# Patient Record
Sex: FEMALE | Race: BLACK OR AFRICAN AMERICAN | NOT HISPANIC OR LATINO | URBAN - METROPOLITAN AREA
[De-identification: names, ages, dates, MRNs, and addresses within clinical notes are randomized per-mention and may not be internally consistent; named-entity substitution may affect disease eponyms.]

---

## 2017-08-23 ENCOUNTER — HOSPITAL ENCOUNTER (EMERGENCY)
Facility: CLINIC | Age: 62
Discharge: HOME | End: 2017-08-23
Attending: EMERGENCY MEDICINE | Admitting: INTERNAL MEDICINE
Payer: COMMERCIAL

## 2017-08-23 ENCOUNTER — TRANSFERRED RECORDS (OUTPATIENT)
Dept: HEALTH INFORMATION MANAGEMENT | Facility: CLINIC | Age: 62
End: 2017-08-23

## 2017-08-23 VITALS
DIASTOLIC BLOOD PRESSURE: 90 MMHG | HEIGHT: 64 IN | RESPIRATION RATE: 14 BRPM | HEART RATE: 85 BPM | OXYGEN SATURATION: 96 % | TEMPERATURE: 97.8 F | SYSTOLIC BLOOD PRESSURE: 154 MMHG | WEIGHT: 147 LBS | BODY MASS INDEX: 25.1 KG/M2

## 2017-08-23 DIAGNOSIS — T18.108A FOREIGN BODY IN ESOPHAGUS, INITIAL ENCOUNTER: ICD-10-CM

## 2017-08-23 DIAGNOSIS — K22.2 STRICTURE OF ESOPHAGUS: ICD-10-CM

## 2017-08-23 PROCEDURE — 96374 THER/PROPH/DIAG INJ IV PUSH: CPT

## 2017-08-23 PROCEDURE — 25000128 H RX IP 250 OP 636: Performed by: INTERNAL MEDICINE

## 2017-08-23 PROCEDURE — 99292 CRITICAL CARE ADDL 30 MIN: CPT

## 2017-08-23 PROCEDURE — 99291 CRITICAL CARE FIRST HOUR: CPT

## 2017-08-23 PROCEDURE — 40000275 ZZH STATISTIC RCP TIME EA 10 MIN

## 2017-08-23 PROCEDURE — 43247 EGD REMOVE FOREIGN BODY: CPT | Performed by: INTERNAL MEDICINE

## 2017-08-23 PROCEDURE — 96375 TX/PRO/DX INJ NEW DRUG ADDON: CPT

## 2017-08-23 RX ORDER — FENTANYL CITRATE 50 UG/ML
50 INJECTION, SOLUTION INTRAMUSCULAR; INTRAVENOUS
Status: COMPLETED | OUTPATIENT
Start: 2017-08-23 | End: 2017-08-23

## 2017-08-23 RX ORDER — PANTOPRAZOLE SODIUM 40 MG/1
40 TABLET, DELAYED RELEASE ORAL DAILY
Qty: 30 TABLET | Refills: 0 | Status: SHIPPED | OUTPATIENT
Start: 2017-08-23 | End: 2017-09-22

## 2017-08-23 RX ORDER — NALOXONE HYDROCHLORIDE 0.4 MG/ML
.1-.4 INJECTION, SOLUTION INTRAMUSCULAR; INTRAVENOUS; SUBCUTANEOUS
Status: DISCONTINUED | OUTPATIENT
Start: 2017-08-23 | End: 2017-08-23 | Stop reason: HOSPADM

## 2017-08-23 RX ORDER — FLUMAZENIL 0.1 MG/ML
0.2 INJECTION, SOLUTION INTRAVENOUS
Status: DISCONTINUED | OUTPATIENT
Start: 2017-08-23 | End: 2017-08-23 | Stop reason: HOSPADM

## 2017-08-23 RX ORDER — SUCRALFATE ORAL 1 G/10ML
1 SUSPENSION ORAL 4 TIMES DAILY
Qty: 420 ML | Refills: 1 | Status: SHIPPED | OUTPATIENT
Start: 2017-08-23

## 2017-08-23 RX ORDER — FENTANYL CITRATE 50 UG/ML
25-50 INJECTION, SOLUTION INTRAMUSCULAR; INTRAVENOUS EVERY 5 MIN PRN
Status: DISCONTINUED | OUTPATIENT
Start: 2017-08-23 | End: 2017-08-23 | Stop reason: HOSPADM

## 2017-08-23 RX ORDER — LIDOCAINE 40 MG/G
CREAM TOPICAL
Status: CANCELLED | OUTPATIENT
Start: 2017-08-23

## 2017-08-23 RX ADMIN — FENTANYL CITRATE 50 MCG: 50 INJECTION, SOLUTION INTRAMUSCULAR; INTRAVENOUS at 05:35

## 2017-08-23 RX ADMIN — MIDAZOLAM HYDROCHLORIDE 1 MG: 1 INJECTION, SOLUTION INTRAMUSCULAR; INTRAVENOUS at 05:53

## 2017-08-23 RX ADMIN — MIDAZOLAM HYDROCHLORIDE 2 MG: 1 INJECTION, SOLUTION INTRAMUSCULAR; INTRAVENOUS at 05:35

## 2017-08-23 RX ADMIN — FENTANYL CITRATE 50 MCG: 50 INJECTION, SOLUTION INTRAMUSCULAR; INTRAVENOUS at 05:53

## 2017-08-23 RX ADMIN — MIDAZOLAM HYDROCHLORIDE 2 MG: 1 INJECTION, SOLUTION INTRAMUSCULAR; INTRAVENOUS at 05:43

## 2017-08-23 ASSESSMENT — ENCOUNTER SYMPTOMS
ABDOMINAL PAIN: 0
CHEST TIGHTNESS: 0
SHORTNESS OF BREATH: 0
SORE THROAT: 1
TROUBLE SWALLOWING: 1
LIGHT-HEADEDNESS: 0

## 2017-08-23 NOTE — ED AVS SNAPSHOT
Emergency Department    [address] [place] [address]    Phone:  [phone]    Fax:  [phone]                                       Mehreen Coronado   MRN: [id number]    Department:   Emergency Department   Date of Visit:  8/23/2017           After Visit Summary Signature Page     I have received my discharge instructions, and my questions have been answered. I have discussed any challenges I see with this plan with the nurse or doctor.    ..........................................................................................................................................  Patient/Patient Representative Signature      ..........................................................................................................................................  Patient Representative Print Name and Relationship to Patient    ..................................................               ................................................  Date                                            Time    ..........................................................................................................................................  Reviewed by Signature/Title    ...................................................              ..............................................  Date                                                            Time

## 2017-08-23 NOTE — DISCHARGE INSTRUCTIONS
Esophageal Blockage, Resolved  The esophagus is the passage that carries food from the mouth to the stomach. You had a blockage in the esophagus. This can happen after swallowing a large piece of food, taking a large pill, or swallowing foreign objects.  If this is a recurring problem, it can be a sign of disease in the esophagus, such as inflammation (swelling and irritation) or scarring. If you did not have a special procedure (endoscopy) today to treat your condition, further testing will be needed to evaluate this problem.  The blockage has cleared. You should be able to swallow normally again.  Home care    For the next 24 hours you may drink liquids and eat soft foods.    You may have been given medicine today to prevent pain and help you relax. If so, you may feel drowsy for the next 4 to 12 hours. Do not drive or operate dangerous equipment until you feel alert again.    If your esophagus was blocked by food, be sure to cut solid food into small pieces before putting it into your mouth. Chew all foods well before swallowing.    If your esophagus was blocked by an over-the-counter pill (such as a vitamin), avoid this size pill in the future. If it was blocked by a prescription medicine, ask your health care provider for another form of medicine.  Follow-up care  Follow up with your health care provider, or as advised. If you continue to have problems, contact your doctor or this facility for advice. If this is a recurring problem, talk to your health care provider about it. He or she may suggest having an endoscopy. This is a look in the esophagus with a small camera and light in a narrow, flexible tube).  When to seek medical advice  Call your health care provider right away if any of these occur:    Chest pain or shortness of breath    Unable to swallow    Significant pain on swallowing    Vomiting blood (red or black)    Blood in your stool (dark red or black color)    Fever of 100.4 F (38 C) or higher,  or as directed by your health care provider  Date Last Reviewed: 3/22/2015    6488-8096 [employer]. [address] [place] [address], [place] [address]. All rights reserved. This information is not intended as a substitute for professional medical care. Always follow your healthcare professional's instructions.

## 2017-08-23 NOTE — ED PROVIDER NOTES
History     Chief Complaint:  Swallowed Foreign Body    HPI   Mehreen Coronado is a 62 year old female who presents with a swallowed foreign body. The patient is a non native English speaker and presents with her son for translation. The patient was sent from [facility] after eating goat meat during the evening when she felt pain and something stick in her throat. The son tried to remove the foreign body with his fingers but couldn't feel anything, though a small amount of blood did get on his fingers. She was able to swallow secretions prior. She received an x-ray at [place] which showed a goat bone stuck in her esophagus. GI was consulted and she was directed here for further treatment. She reports having trouble swallowing secretions but denies any vomiting, abdominal pain, trouble breathing, or any other symptoms.    Allergies:  No known drug allergies.     Medications:    OMEPRAZOLE PO     Past Medical History:    Acid Reflux    Past Surgical History:    Cholecystectomy    Family History:    History reviewed. No pertinent family history.    Social History:  Smoking status: Never smoker  Alcohol use: No  Marital Status:  Unknown     Review of Systems   HENT: Positive for sore throat and trouble swallowing.         + foreign body   Respiratory: Negative for chest tightness and shortness of breath.    Cardiovascular: Negative for chest pain.   Gastrointestinal: Negative for abdominal pain.   Neurological: Negative for light-headedness.   All other systems reviewed and are negative.      Physical Exam     Patient Vitals for the past 24 hrs:   BP Temp Temp src Pulse Heart Rate Resp SpO2 Height Weight   08/23/17 0630 119/69 - - 85 - - 100 % - -   08/23/17 0620 118/71 - - 88 - - 100 % - -   08/23/17 0615 125/75 - - 90 - - 100 % - -   08/23/17 0610 118/68 - - 96 - - 100 % - -   08/23/17 0605 141/77 - - 106 106 - 100 % - -   08/23/17 0600 122/73 - - 99 99 - 100 % - -   08/23/17 0555 156/89 - - - 104 -  "100 % - -   08/23/17 0550 150/83 - - - 114 - 100 % - -   08/23/17 0545 157/76 - - - 107 - 100 % - -   08/23/17 0540 138/80 - - - 102 - 100 % - -   08/23/17 0535 144/77 - - - 110 - 100 % - -   08/23/17 0530 (!) 149/98 - - - 111 - 100 % - -   08/23/17 0526 142/87 - - - 105 - 100 % - -   08/23/17 0500 147/84 - - - - - 98 % - -   08/23/17 0430 (!) 151/91 - - - - - 98 % - -   08/23/17 0359 153/90 97.8  F (36.6  C) Temporal 106 - 14 100 % 1.626 m (5' 4\") 66.7 kg (147 lb)       Physical Exam  Nursing note and vitals reviewed.  Constitutional:  Oriented to person, place, and time. Cooperative.   HENT:   Nose:    Nose normal.   Mouth/Throat:   Mucous membranes are normal.   Eyes:    Conjunctivae normal and EOM are normal.      Pupils are equal, round, and reactive to light.   Neck:    Trachea normal.   Cardiovascular:  Normal rate, regular rhythm, normal heart sounds and normal pulses. No murmur heard.  Pulmonary/Chest:  Effort normal and breath sounds normal.   Abdominal:   Soft. Normal appearance and bowel sounds are normal.      There is no tenderness.      There is no rebound and no CVA tenderness.   Musculoskeletal:  Extremities atraumatic x 4.   Lymphadenopathy:  No cervical adenopathy.   Neurological:   Alert and oriented to person, place, and time. Normal strength.      No cranial nerve deficit or sensory deficit. GCS eye subscore is 4. GCS verbal subscore is 5. GCS motor subscore is 6.   Skin:    Skin is intact. No rash noted.   Psychiatric:   Normal mood and affect.      Emergency Department Course   Interventions:  (0535) Fentanyl 50 mcg, IV Injection  (0535) Versed 2 mg, IV Injection  (0553) Fentanyl 50 mcg, IV Injection  (0553) Versed 2 mg, IV Injection    Emergency Department Course:  Nursing notes and vitals reviewed.  (5975) I performed an exam of the patient as documented above.    Patient had an upper endoscopy, under anaesthesia noted above, performed by the on-call GI physician Dr. Gandhi here in the ER to " remove the lodged foreign body from the patient's esophagus.    Findings and plan explained to the patient and son. Patient discharged home with instructions regarding supportive care, medications, and reasons to return. The importance of close follow-up was reviewed. The patient was prescribed Protonix and Sucralfate.  Impression & Plan      Medical Decision Making:  Mehreen Coronado is a 62 year old female who apparently swallowed a goat bone and it became lodged in her upper esophagus. She first presented to the [facility] stand alone ER. They sent her here for GI consultation and endoscopy. They spoke with the on-call GI physician, Dr. Gandhi. I called Dr. Gandhi when the patient arrived here as well, and he then came in and performed an upper endoscopy and was able to remove the bone and bone fragments. The patient feels comfortable going home and prefers to go home. I will send her home with prescriptions for Protonix and Carafate. She is to follow up with Dr. Gandhi as well so that he can dilate the stricture that he saw. She should return with any concerns or worsening symptoms.    Diagnosis:    ICD-10-CM     Encounter Diagnoses   Name Primary?     Foreign body in esophagus, initial encounter      Stricture of esophagus          Disposition:  Patient is discharged to home.      Discharge Medications:  New Prescriptions    PANTOPRAZOLE (PROTONIX) 40 MG EC TABLET    Take 1 tablet (40 mg) by mouth daily for 30 doses    SUCRALFATE (CARAFATE) 1 GM/10ML SUSPENSION    Take 10 mLs (1 g) by mouth 4 times daily         Ry Hope  8/23/2017    EMERGENCY DEPARTMENT      I, Ry Hope, am serving as a scribe on 8/23/2017 at 4:09 AM to personally document services performed by Dr. Smyth based on my observations and the provider's statements to me.        Olman Smyth MD  08/23/17 1605

## 2017-08-23 NOTE — ED AVS SNAPSHOT
Emergency Department    [address] [facility] [address]    Phone:  [phone]    Fax:  [phone]                                       Mehreen Coronado   MRN: [id number]    Department:   Emergency Department   Date of Visit:  8/23/2017           Patient Information     Date Of Birth          1955        Your diagnoses for this visit were:     Foreign body in esophagus, initial encounter     Stricture of esophagus        You were seen by Olman Smyth MD.      Follow-up Information     Schedule an appointment as soon as possible for a visit with Nilson Garcia MD.    Specialty:  Gastroenterology    Contact information:    [facility]  [facility] [address] [place] [address] [phone]          Follow up with  Emergency Department.    Specialty:  EMERGENCY MEDICINE    Why:  If symptoms worsen    Contact information:    [address] [facility] [id number] [phone]        Discharge Instructions         Esophageal Blockage, Resolved  The esophagus is the passage that carries food from the mouth to the stomach. You had a blockage in the esophagus. This can happen after swallowing a large piece of food, taking a large pill, or swallowing foreign objects.  If this is a recurring problem, it can be a sign of disease in the esophagus, such as inflammation (swelling and irritation) or scarring. If you did not have a special procedure (endoscopy) today to treat your condition, further testing will be needed to evaluate this problem.  The blockage has cleared. You should be able to swallow normally again.  Home care    For the next 24 hours you may drink liquids and eat soft foods.    You may have been given medicine today to prevent pain and help you relax. If so, you may feel drowsy for the next 4 to 12 hours. Do not drive or operate dangerous equipment until you feel alert again.    If your esophagus was blocked by food, be sure to cut solid food into  small pieces before putting it into your mouth. Chew all foods well before swallowing.    If your esophagus was blocked by an over-the-counter pill (such as a vitamin), avoid this size pill in the future. If it was blocked by a prescription medicine, ask your health care provider for another form of medicine.  Follow-up care  Follow up with your health care provider, or as advised. If you continue to have problems, contact your doctor or this facility for advice. If this is a recurring problem, talk to your health care provider about it. He or she may suggest having an endoscopy. This is a look in the esophagus with a small camera and light in a narrow, flexible tube).  When to seek medical advice  Call your health care provider right away if any of these occur:    Chest pain or shortness of breath    Unable to swallow    Significant pain on swallowing    Vomiting blood (red or black)    Blood in your stool (dark red or black color)    Fever of 100.4 F (38 C) or higher, or as directed by your health care provider  Date Last Reviewed: 3/22/2015    7331-0676 [employer]. [address] [place] [address]. All rights reserved. This information is not intended as a substitute for professional medical care. Always follow your healthcare professional's instructions.          24 Hour Appointment Hotline       To make an appointment at any [facility] clinic, call [phone] ([phone]). If you don't have a family doctor or clinic, we will help you find one. [facility] clinics are conveniently located to serve the needs of you and your family.             Review of your medicines      START taking        Dose / Directions Last dose taken    pantoprazole 40 MG EC tablet   Commonly known as:  PROTONIX   Dose:  40 mg   Quantity:  30 tablet        Take 1 tablet (40 mg) by mouth daily for 30 doses   Refills:  0        sucralfate 1 GM/10ML suspension   Commonly known as:  CARAFATE   Dose:  1 g    Quantity:  420 mL        Take 10 mLs (1 g) by mouth 4 times daily   Refills:  1          Our records show that you are taking the medicines listed below. If these are incorrect, please call your family doctor or clinic.        Dose / Directions Last dose taken    OMEPRAZOLE PO        Refills:  0                Prescriptions were sent or printed at these locations (2 Prescriptions)                   Other Prescriptions                Printed at Department/Unit printer (2 of 2)         pantoprazole (PROTONIX) 40 MG EC tablet               sucralfate (CARAFATE) 1 GM/10ML suspension                Orders Needing Specimen Collection     None      Pending Results     No orders found from 8/21/2017 to 8/24/2017.            Pending Culture Results     No orders found from 8/21/2017 to 8/24/2017.            Pending Results Instructions     If you had any lab results that were not finalized at the time of your Discharge, you can call the ED Lab Result RN at [phone]. You will be contacted by this team for any positive Lab results or changes in treatment. The nurses are available 7 days a week from 10A to 6:30P.  You can leave a message 24 hours per day and they will return your call.        Test Results From Your Hospital Stay               Clinical Quality Measure: Blood Pressure Screening     Your blood pressure was checked while you were in the emergency department today. The last reading we obtained was  BP: (!) 135/93 . Please read the guidelines below about what these numbers mean and what you should do about them.  If your systolic blood pressure (the top number) is less than 120 and your diastolic blood pressure (the bottom number) is less than 80, then your blood pressure is normal. There is nothing more that you need to do about it.  If your systolic blood pressure (the top number) is 120-139 or your diastolic blood pressure (the bottom number) is 80-89, your blood pressure may be higher than it should be. You  "should have your blood pressure rechecked within a year by a primary care provider.  If your systolic blood pressure (the top number) is 140 or greater or your diastolic blood pressure (the bottom number) is 90 or greater, you may have high blood pressure. High blood pressure is treatable, but if left untreated over time it can put you at risk for heart attack, stroke, or kidney failure. You should have your blood pressure rechecked by a primary care provider within the next 4 weeks.  If your provider in the emergency department today gave you specific instructions to follow-up with your doctor or provider even sooner than that, you should follow that instruction and not wait for up to 4 weeks for your follow-up visit.        Thank you for choosing [facility]       Thank you for choosing [facility] for your care. Our goal is always to provide you with excellent care. Hearing back from our patients is one way we can continue to improve our services. Please take a few minutes to complete the written survey that you may receive in the mail after you visit with us. Thank you!        [employer] Information     [employer] lets you send messages to your doctor, view your test results, renew your prescriptions, schedule appointments and more. To sign up, go to [email or web address] . Click on \"Log in\" on the left side of the screen, which will take you to the Welcome page. Then click on \"Sign up Now\" on the right side of the page.     You will be asked to enter the access code listed below, as well as some personal information. Please follow the directions to create your username and password.     Your access code is: [email or web address]  Expires: 2017  7:49 AM     Your access code will  in 90 days. If you need help or a new code, please call your [facility] clinic or [phone].        Care EveryWhere ID     This is your Care EveryWhere ID. This could be used by other organizations to access your [facility] medical " records  [id number]        Equal Access to Services     HERNANDO LOBO : Tato Cee, meme mckeon, kee almaguer, vee ham. So [facility] [phone].    ATENCIÓN: Si habla español, tiene a cortez disposición servicios gratuitos de asistencia lingüística. Llame al [phone].    We comply with applicable federal civil rights laws and [place] laws. We do not discriminate on the basis of race, color, national origin, age, disability sex, sexual orientation or gender identity.            After Visit Summary       This is your record. Keep this with you and show to your community pharmacist(s) and doctor(s) at your next visit.

## 2017-08-23 NOTE — PROGRESS NOTES
Airway assist for endoscopy procedure. No respiratory intervention needed.    8/23/2017  Loyda Silva RT

## 2017-08-23 NOTE — OR NURSING
EGD in ED. Vitals and sedation monitored by ED nurse. Removed foreign body. See Dr. Garcia's provation note for details.

## 2017-08-24 LAB — UPPER GI ENDOSCOPY: NORMAL

## 2017-09-10 NOTE — H&P
"[facility]  Pre-Endoscopy History and Physical     Mehreen Coronado MRN# [id number]   YOB: 1955 Age: 62 year old     Date of Procedure: 8/23/2017  Primary care provider: No Ref-Primary, Physician  Type of Endoscopy: esophagogastroduodenoscopy (upper GI endoscopy)  Reason for Procedure: Acute dysphagia, Abnormal XRAY and impacted bone piece in esophagus  Type of Anesthesia Anticipated: Moderate Sedation    HPI:    Mehreen is a 62 year old female who will be undergoing the above procedure.      A history and physical has been performed. The patient's medications and allergies have been reviewed. The risks and benefits of the procedure and the sedation options and risks were discussed with the patient.  All questions were answered and informed consent was obtained.      She denies a personal or family history of anesthesia complications or bleeding disorders.     No Known Allergies     Prior to Admission Medications   Prescriptions Last Dose Informant Patient Reported? Taking?   OMEPRAZOLE PO   Yes Yes      Facility-Administered Medications: None       There is no problem list on file for this patient.       Past Medical History:   Diagnosis Date     Acid reflux         Past Surgical History:   Procedure Laterality Date     CHOLECYSTECTOMY       ESOPHAGOSCOPY, GASTROSCOPY, DUODENOSCOPY (EGD), COMBINED N/A 8/23/2017    Procedure: COMBINED ESOPHAGOSCOPY, GASTROSCOPY, DUODENOSCOPY (EGD), REMOVE FOREIGN BODY;;  Surgeon: Nilson Garcia MD;  Location: [facility]       Social History   Substance Use Topics     Smoking status: Never Smoker     Smokeless tobacco: Never Used     Alcohol use No       No family history on file.    REVIEW OF SYSTEMS:     5 point ROS negative except as noted above in HPI, including Gen., Resp., CV, GI &  system review.      PHYSICAL EXAM:   /90  Pulse 85  Temp 97.8  F (36.6  C) (Temporal)  Resp 14  Ht 1.626 m (5' 4\")  Wt 66.7 kg (147 lb)  SpO2 96%  BMI " "25.23 kg/m2 Estimated body mass index is 25.23 kg/(m^2) as calculated from the following:    Height as of this encounter: 1.626 m (5' 4\").    Weight as of this encounter: 66.7 kg (147 lb).   GENERAL APPEARANCE: healthy, alert and no distress  MENTAL STATUS: alert  AIRWAY EXAM: Mallampatti Class I (visualization of the soft palate, fauces, uvula, anterior and posterior pillars)  RESP: lungs clear to auscultation - no rales, rhonchi or wheezes  CV: regular rates and rhythm      DIAGNOSTICS:    Not indicated      IMPRESSION   ASA Class 2 - Mild systemic disease        PLAN:       Plan for esophagogastroduodenoscopy (upper GI endoscopy). We discussed the risks, benefits and alternatives and the patient wished to proceed.    The above has been forwarded to the consulting provider.      Signed Electronically by: Nilson Garcia MD  September 10, 2017  "

## 2022-09-23 ENCOUNTER — MEDICAL CORRESPONDENCE (OUTPATIENT)
Dept: HEALTH INFORMATION MANAGEMENT | Facility: CLINIC | Age: 67
End: 2022-09-23

## 2022-09-28 ENCOUNTER — TRANSCRIBE ORDERS (OUTPATIENT)
Dept: OTHER | Age: 67
End: 2022-09-28

## 2022-09-28 DIAGNOSIS — H70.12 CHRONIC MASTOIDITIS OF LEFT SIDE: Primary | ICD-10-CM

## 2023-06-14 ENCOUNTER — MEDICAL CORRESPONDENCE (OUTPATIENT)
Dept: HEALTH INFORMATION MANAGEMENT | Facility: CLINIC | Age: 68
End: 2023-06-14
Payer: COMMERCIAL

## 2023-06-14 ENCOUNTER — TRANSCRIBE ORDERS (OUTPATIENT)
Dept: OTHER | Age: 68
End: 2023-06-14

## 2023-06-14 DIAGNOSIS — H70.12 CHRONIC MASTOIDITIS OF LEFT SIDE: Primary | ICD-10-CM

## 2023-07-03 NOTE — TELEPHONE ENCOUNTER
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

## 2023-08-28 DIAGNOSIS — Z01.10 ENCOUNTER FOR HEARING TEST: Primary | ICD-10-CM

## 2023-09-21 ENCOUNTER — PRE VISIT (OUTPATIENT)
Dept: OTOLARYNGOLOGY | Facility: CLINIC | Age: 68
End: 2023-09-21